# Patient Record
Sex: FEMALE | Race: OTHER | NOT HISPANIC OR LATINO
[De-identification: names, ages, dates, MRNs, and addresses within clinical notes are randomized per-mention and may not be internally consistent; named-entity substitution may affect disease eponyms.]

---

## 2023-06-12 ENCOUNTER — APPOINTMENT (OUTPATIENT)
Dept: PLASTIC SURGERY | Facility: CLINIC | Age: 38
End: 2023-06-12
Payer: COMMERCIAL

## 2023-06-12 PROBLEM — Z00.00 ENCOUNTER FOR PREVENTIVE HEALTH EXAMINATION: Status: ACTIVE | Noted: 2023-06-12

## 2023-06-12 PROCEDURE — 99203 OFFICE O/P NEW LOW 30 MIN: CPT | Mod: 95

## 2023-06-12 NOTE — HISTORY OF PRESENT ILLNESS
[Home] : at home, [unfilled] , at the time of the visit. [Medical Office: ([facility])___] : at the medical office located in  [Verbal consent obtained from patient] : the patient, [unfilled]

## 2023-06-13 ENCOUNTER — TRANSCRIPTION ENCOUNTER (OUTPATIENT)
Age: 38
End: 2023-06-13

## 2023-06-14 NOTE — DISCUSSION/SUMMARY
[FreeTextEntry1] : This regan patient began transitioning in 2005/2006.\par She has been on hormonal therapy consistently since 2006/2007.\par She has been in therapy and was diagnosed with Gender Dysphoria concerned about certain facial features that appear masculine and cause bullying desires facial feminization surgery followed by Transgender team.\par The following facial features appear masculine and will need to be modified: \par -Nose \par -Chin\par -Lip\par \par + FH: noncontributory \par \par Allergies:\par none\par \par Meds:\par -estrogen \par - Progesterone\par \par Surgical History\par Surgery Type: castration\par Year: 2011\par Complications?: none\par \par Denies previous botox, fillers or silicone injections. \par \par SH: Admits to marijuana use, no secondary tobacco use\par \par ROS:\par General health / Constitutional: no fever, no chills, no unusual weight changes, no energy level changes, no night sweats \par Skin: No color or pigmentation changes, no pruritis, no rashes, no ulcers, \par Hair: No changes in color, texture, distribution, loss \par Nails: No color changes, brittleness, infection \par Head: No headaches, no new jaw pain \par Eyes: Good visual acuity, no color blindness, no corrective lenses, no photophobia, no diplopia, no blurred vision, no infection, pain, no medications, \par Ear: no tinnitus, no discharge, no pain, no medications \par Nose: no epistaxis, no rhinorrhea, no rhinitis, no pain, \par Mouth & Throat: no gingivitis, no gingival bleeding, no ulcers, no voice changes, no changes in oral mucosa or tongue Neck no stiffness, no pain, no lymphadenitis, no thyroid disorders, \par Respiratory: no cough, no dyspnea, no wheezing, no chest pain, cyanosis, no pneumonia, no asthma, \par Cardiovascular: no chest pain, no palpitations, no irregular rhythm, no tachycardia, no bradycardia, no heart failure, no dyspnea on exertion (TADEO), no edema \par Gastrointestinal: no nausea / vomiting, no dysphagia, no reflux / GERD, no abdominal pain, no jaundice \par Musculoskeletal: no pain in muscles, bones, or joints; no fractures, no dislocations, no muscular weakness, no atrophy \par Neurological: no paresis, no paralysis, no paresthesia, no seizures, no dizziness, no syncope, no ataxia, no tremor \par Psychological: no childhood behavioral problems, no irritability, no sleep changes \par Hematological: no anemia, no bruising, no bleeding tendencies, \par \par PHYSICAL EXAM \par General: WDWN, in no distress, A & O x 3 (person, place, time) \par HEENT Head: AT/NC (atraumatic, normocephalic), including TMJ, sensory and motor; \par Eyes: EOMI, PERRLA \par Ears: exterior, nl hearing, \par Nose: + slightly wide, bulbous nasal tip with acute nasolabial angle intranasal exam showed enlarged turbinates and deviated caudal septum \par Throat & mouth: gd palate elevation, nl tongue mobility, nl tonsil size; + slightly prominent mandibular angle with active masseter, boxy wide chin, Hypoplastic upper lip.\par Neck: no masses, nl pulses, no prominent tracheal bulge ("Roberth's Apple") \par \par We had a 25 minute meeting with the patient discussing diagnosis and medical management issues and outcomes. \par \par First stage FFS: \par 21122: Osseous genioplasty narrowing, shortening\par 21125: Mandibular reconstruction with prosthetics bilateral\par 00880: Rhinoplasty open\par 60016: Submucous resection of septum\par 18101: Cartilage grafting for nasal reconstruction, use of cadaveric cartilage for  grafts, columellar strut, tip graft\par 68465: Upper lip augmentation with temporalis fascia\par \par ·       93306 (osseous genioplasty): This patient has a wide, large chin that contributes to her feelings of gender dysphoria and being mis-gendered in public.  The patient would benefit from an osseous genioplasty that narrows and shortens the chin. The osseous genioplasty will help create a more feminine and more, slender chin. \par \par ·       51410 (Reconstruction of lower jaw with prosthetic): This patient complained of a male shape to the lower one-third of her face. Reconstruction with a titanium implant used to create a chin point angle and support the bone in healing will help the patient achieve her goal of a more female lower face.\par \par ·       06486 (Rhinoplasty): This patient’s nose has characteristics of a male nose. The male nose is often larger and wider with a more bulbous nasal tip. These male nasal features make her feel masculine which exacerbates her gender dysphoria. A rhinoplasty would be beneficial to feminize her nose by creating a smaller nose and more delicate, softer nasal tip. The lateral dorsal shape will also be feminized by the rhinoplasty.\par \par ·       17114 (Submucous resection of nose): This patient’s nasal septum is shaped like a male septum. The septum is the supportive pole that holds up the structure of the nasal pyramid. In this case the septum will also be used to provide cartilage tissue necessary for nasal grafts. This septoplasty is required to modify the septum to create a straight nose with good functional breathing while taking away the characteristics of a male nose.\par \par ·       41747 (Cartilage grafting for nasal reconstruction): Cartilage grafting is crucial for the performance of the feminizing rhinoplasty. This patient has an ethnic type of nose. With regards to her nose, she wants to keep true to her ethnicity while appearing more feminine. To do that cartilage grafts including, bilateral  grafts, a columellar strut graft, a dorsal onlay graft, and nasal tip graft are all necessary.  The dorsal onlay graft will raise the nasal radix and improve the frontonasal regional shape.\par \par ·       05604 (Upper lip augmentation): Females are shown to have zavaleta lips than males, therefore, an upper lip augmentation will create a more feminine appearance for this patient as she has currently has hypoplastic lips. Lip augmentation is also a procedure that not all patients need but we deem that this procedure is necessary for this patient as it well help alleviate her gender dysphoria. \par \par No CT scan needed. She will need to provide a letter from her therapist and hormone provider. Will need PCP clearance.\par \par Patient seen in conjunction with Dr. Stan Cook.\par

## 2024-04-15 ENCOUNTER — APPOINTMENT (OUTPATIENT)
Dept: PLASTIC SURGERY | Facility: CLINIC | Age: 39
End: 2024-04-15

## 2024-04-15 PROCEDURE — XXXXX: CPT | Mod: 1L

## 2024-04-22 ENCOUNTER — TRANSCRIPTION ENCOUNTER (OUTPATIENT)
Age: 39
End: 2024-04-22

## 2024-04-22 ENCOUNTER — APPOINTMENT (OUTPATIENT)
Dept: PLASTIC SURGERY | Facility: CLINIC | Age: 39
End: 2024-04-22
Payer: COMMERCIAL

## 2024-04-22 VITALS
SYSTOLIC BLOOD PRESSURE: 143 MMHG | HEIGHT: 65 IN | WEIGHT: 170.42 LBS | TEMPERATURE: 98 F | RESPIRATION RATE: 16 BRPM | OXYGEN SATURATION: 100 % | DIASTOLIC BLOOD PRESSURE: 88 MMHG | HEART RATE: 70 BPM

## 2024-04-22 PROCEDURE — 99214 OFFICE O/P EST MOD 30 MIN: CPT

## 2024-04-22 NOTE — DISCUSSION/SUMMARY
[FreeTextEntry1] : Patient presented for preoperative consultation prior to facial feminization surgery We reviewed the virtual planning with the patient She desires to have the following facial features modified: -Nose -Jawline -Neck -Lip augmentation We reviewed these procedures and their risks   FH: noncontributory   SH: no secondary tobacco use,   ROS: General health / Constitutional      no fever, no chills, no unusual weight changes, no energy level changes, no night sweats Skin       No color or pigmentation changes, no pruritis, no rashes, no ulcers, Hair       No changes in color, texture,  distribution, loss Nails       No color changes, brittleness, infection Head       No headaches, no new jaw pain Eyes       Good visual acuity, no color blindness, no corrective lenses, no photophobia, no diplopia, no blurred vision, no infection, pain, no medications, Ear      no tinnitus, no discharge, no pain, no medications Nose      no epistaxis, no rhinorrhea, no rhinitis, no pain, Mouth & Throat      no gingivitis, no gingival bleeding, no ulcers, no voice changes, no changes in oral mucosa or tongue Neck      no stiffness, no pain, no lymphadenitis, no thyroid disorders, Respiratory      no cough, no dyspnea, no wheezing,  no chest pain, cyanosis, no pneumonia, no asthma, Cardiovascular      no chest pain, no palpitations, no irregular rhythm, no tachycardia, no bradycardia, no heart failure, no dyspnea on exertion (TADEO), no edema Gastrointestinal      no nausea / vomiting, no dysphagia, no reflux / GERD, no abdominal pain, no jaundice Musculoskeletal      no pain in muscles, bones, or joints; no fractures, no dislocations, no muscular weakness, no atrophy Neurological      no paresis, no paralysis, no paresthesia, no seizures, no dizziness, no syncope, no ataxia, no tremor Psychological      no childhood behavioral problems, no irritability, no sleep changes Hematological      no anemia, no bruising, no bleeding tendencies,   PHYSICAL EXAM General WDWN, in no distress,  A & O x 3 (person, place, time) HEENT Head: AT/NC (atraumatic, normocephalic), including TMJ, sensory and motor; Prominent brow and lateral orbital rim type III Eyes: EOMI, PERRLA Ears: exterior, nl hearing, Nose: slightly wide, bulbous nasal tip with acute nasiolabial angle intranasal exam showed enlarged turbinates and deviated caudal septum Throat & mouth: gd palate elevation, nl tongue mobility, nl tonsil size Prominent mandibular angle with active masseter Wide chin   Neck: no masses, nl pulses +excess submental fat   We had a 25 minute meeting with the patient discussing diagnosis and medical management issues and outcomes.  FFS: -mandibular angle reduction b/l -chin narrowing -rhinoplasty, SMR, cartilage grafting -submental fat excision, platysmaplasty -Lip augmentation   Spend 35 minutes with patient discussing the diagnosis and treatment plan. Patient informed of the timing and risks moving forward. All patient questions were answered. Patient was given written instructions for care and follow up visit. We discussed the instructions and the patient confirmed an understanding of them.   Bleeding- It is possible, though unusual, to experience a bleeding episode during or after surgery. Should post-operative bleeding occur, it may require emergency treatment to drain accumulated blood or blood transfusion. Intra-operative blood transfusion may also be required. Do not take any aspirin or anti-inflammatory medications for ten days before or after surgery, as this may increase the risk of bleeding. Non-prescription herbs and dietary supplements can increase the risk of surgical bleeding. Hematoma can occur at any time following injury to the breast. If blood transfusions are necessary to treat blood loss, there is the risk of blood-related infections such as hepatitis and HIV (AIDS). Heparin medications that are used to prevent blood clots in veins can produce bleeding and decreased blood platelets.   Infection- Infection is unusual after surgery. Should an infection occur, additional treatment including antibiotics, hospitalization, or additional surgery may be necessary.   Change Skin Sensation- You may experience a diminished (or loss) of sensitivity of the skin of operative area. Partial or permanent loss of skin sensation can occur after surgery. Skin Contour Irregularities- Contour and shape irregularities may occur after surgery. Visible and palpable wrinkling may occur. Residual skin irregularities at the ends of the incisions or dog ears are always a possibility when there is excessive redundant skin. This may improve with time, or it can be surgically corrected.   Sutures- Most surgical techniques use deep sutures. You may notice these sutures after your surgery. Sutures may spontaneously poke through the skin, become visible or produce irritation that requires suture removal.   Skin Discoloration / Swelling- Some bruising and swelling normally occurs following surgery. The skin in or near the surgical site can appear either lighter or darker than surrounding skin. Although uncommon, swelling and skin discoloration may persist for long periods of time and, in rare situations, may be permanent.   Skin Sensitivity- Itching, tenderness, or exaggerated responses to hot or cold temperatures may occur after surgery. Usually this resolves during healing, but in rare situations it may be chronic.   Scarring- All surgery leaves scars, some more visible than others. Although good wound healing after a surgical procedure is expected, abnormal scars may occur within the skin and deeper tissues. Scars may be unattractive and of different color than the surrounding skin tone. Scar appearance may also vary within the same scar. Scars may be asymmetrical (appear different on the right and left side of the body). There is the possibility of visible marks in the skin from sutures. In some cases scars may require surgical revision or treatment.   Damage to Deeper Structures- There is the potential for injury to deeper structures including, nerves, blood vessels, muscles during any surgical procedure. The potential for this to occur varies according to the type of procedure being performed. Injury to deeper structures may be temporary or permanent.   Delayed Healing- Wound disruption or delayed wound healing is possible. Some areas of the skin may not heal normally and may take a long time to heal. Areas of skin tissue may die. This may require frequent dressing changes or further surgery to remove the non-healed tissue. Individuals who have decreased blood supply to tissue from past surgery or radiation therapy may be at increased risk for wound healing and poor surgical outcome. Smokers have a greater risk of skin loss and wound healing complications.   Fat Necrosis- Fatty tissue found deep in the skin might die. This may produce areas of firmness within the skin. Additional surgery to remove areas of fat necrosis may be necessary. There is the possibility of contour irregularities in the skin that may result from fat necrosis.   Allergic Reactions- In rare cases, local allergies to tape, suture material and glues, blood products, topical preparations or injected agents have been reported. Serious systemic reactions including shock (anaphylaxis) may occur in response to drugs used during surgery and prescription medicines. Allergic reactions may require additional treatment.

## 2024-04-23 ENCOUNTER — APPOINTMENT (OUTPATIENT)
Dept: PLASTIC SURGERY | Facility: HOSPITAL | Age: 39
End: 2024-04-23
Payer: COMMERCIAL

## 2024-04-23 ENCOUNTER — INPATIENT (INPATIENT)
Facility: HOSPITAL | Age: 39
LOS: 0 days | Discharge: HOME | End: 2024-04-24
Attending: SURGERY | Admitting: SURGERY
Payer: COMMERCIAL

## 2024-04-23 DIAGNOSIS — K08.409 PARTIAL LOSS OF TEETH, UNSPECIFIED CAUSE, UNSPECIFIED CLASS: Chronic | ICD-10-CM

## 2024-04-23 DIAGNOSIS — Z90.79 ACQUIRED ABSENCE OF OTHER GENITAL ORGAN(S): Chronic | ICD-10-CM

## 2024-04-23 PROCEDURE — 40799 UNLISTED PROCEDURE LIPS: CPT

## 2024-04-23 PROCEDURE — 21125 AUGMENTATION MNDBLR PROSTC: CPT

## 2024-04-23 PROCEDURE — 30520 REPAIR OF NASAL SEPTUM: CPT

## 2024-04-23 PROCEDURE — 30410 RECONSTRUCTION OF NOSE: CPT

## 2024-04-23 PROCEDURE — 20912 REMOVE CARTILAGE FOR GRAFT: CPT

## 2024-04-23 PROCEDURE — 21122 GENIOP SLDG OSTEOT 2/>: CPT

## 2024-04-23 DEVICE — SCREW MAXDRIVE 1 LVL 2X7MM: Type: IMPLANTABLE DEVICE | Site: BILATERAL | Status: FUNCTIONAL

## 2024-04-23 DEVICE — SCREW EMERG CROSS DRIVE TI 2X9: Type: IMPLANTABLE DEVICE | Site: BILATERAL | Status: FUNCTIONAL

## 2024-04-23 DEVICE — PLATE SMITH GENIOPLASTY 2X5MM: Type: IMPLANTABLE DEVICE | Site: BILATERAL | Status: FUNCTIONAL

## 2024-04-23 RX ORDER — ACETAMINOPHEN 500 MG
975 TABLET ORAL EVERY 6 HOURS
Refills: 0 | Status: DISCONTINUED | OUTPATIENT
Start: 2024-04-23 | End: 2024-04-24

## 2024-04-23 RX ORDER — OXYCODONE HYDROCHLORIDE 5 MG/1
5 TABLET ORAL EVERY 4 HOURS
Refills: 0 | Status: DISCONTINUED | OUTPATIENT
Start: 2024-04-23 | End: 2024-04-24

## 2024-04-23 RX ORDER — SENNA PLUS 8.6 MG/1
2 TABLET ORAL AT BEDTIME
Refills: 0 | Status: DISCONTINUED | OUTPATIENT
Start: 2024-04-23 | End: 2024-04-24

## 2024-04-23 RX ORDER — BENZOCAINE AND MENTHOL 5; 1 G/100ML; G/100ML
1 LIQUID ORAL
Refills: 0 | Status: DISCONTINUED | OUTPATIENT
Start: 2024-04-23 | End: 2024-04-24

## 2024-04-23 RX ORDER — HYDROMORPHONE HYDROCHLORIDE 2 MG/ML
0.5 INJECTION INTRAMUSCULAR; INTRAVENOUS; SUBCUTANEOUS EVERY 4 HOURS
Refills: 0 | Status: DISCONTINUED | OUTPATIENT
Start: 2024-04-23 | End: 2024-04-23

## 2024-04-23 RX ORDER — OXYCODONE HYDROCHLORIDE 5 MG/1
10 TABLET ORAL EVERY 4 HOURS
Refills: 0 | Status: DISCONTINUED | OUTPATIENT
Start: 2024-04-23 | End: 2024-04-24

## 2024-04-23 RX ORDER — ACETAMINOPHEN 500 MG
1000 TABLET ORAL ONCE
Refills: 0 | Status: COMPLETED | OUTPATIENT
Start: 2024-04-23 | End: 2024-04-23

## 2024-04-23 RX ORDER — DIAZEPAM 5 MG
5 TABLET ORAL EVERY 8 HOURS
Refills: 0 | Status: DISCONTINUED | OUTPATIENT
Start: 2024-04-23 | End: 2024-04-24

## 2024-04-23 RX ORDER — LANOLIN ALCOHOL/MO/W.PET/CERES
3 CREAM (GRAM) TOPICAL AT BEDTIME
Refills: 0 | Status: DISCONTINUED | OUTPATIENT
Start: 2024-04-23 | End: 2024-04-24

## 2024-04-23 RX ORDER — GABAPENTIN 400 MG/1
300 CAPSULE ORAL
Refills: 0 | Status: DISCONTINUED | OUTPATIENT
Start: 2024-04-23 | End: 2024-04-24

## 2024-04-23 RX ORDER — ONDANSETRON 8 MG/1
4 TABLET, FILM COATED ORAL EVERY 6 HOURS
Refills: 0 | Status: DISCONTINUED | OUTPATIENT
Start: 2024-04-23 | End: 2024-04-24

## 2024-04-23 RX ORDER — CEFAZOLIN SODIUM 1 G
2000 VIAL (EA) INJECTION EVERY 8 HOURS
Refills: 0 | Status: DISCONTINUED | OUTPATIENT
Start: 2024-04-23 | End: 2024-04-24

## 2024-04-23 RX ORDER — HYDROMORPHONE HYDROCHLORIDE 2 MG/ML
0.25 INJECTION INTRAMUSCULAR; INTRAVENOUS; SUBCUTANEOUS ONCE
Refills: 0 | Status: DISCONTINUED | OUTPATIENT
Start: 2024-04-23 | End: 2024-04-23

## 2024-04-23 RX ORDER — DIPHENHYDRAMINE HCL 50 MG
25 CAPSULE ORAL EVERY 4 HOURS
Refills: 0 | Status: DISCONTINUED | OUTPATIENT
Start: 2024-04-23 | End: 2024-04-24

## 2024-04-23 RX ORDER — SODIUM CHLORIDE 9 MG/ML
1000 INJECTION, SOLUTION INTRAVENOUS
Refills: 0 | Status: DISCONTINUED | OUTPATIENT
Start: 2024-04-23 | End: 2024-04-24

## 2024-04-23 RX ORDER — CHLORHEXIDINE GLUCONATE 213 G/1000ML
15 SOLUTION TOPICAL
Refills: 0 | Status: DISCONTINUED | OUTPATIENT
Start: 2024-04-23 | End: 2024-04-24

## 2024-04-23 RX ORDER — METOCLOPRAMIDE HCL 10 MG
10 TABLET ORAL EVERY 8 HOURS
Refills: 0 | Status: DISCONTINUED | OUTPATIENT
Start: 2024-04-23 | End: 2024-04-24

## 2024-04-23 RX ADMIN — HYDROMORPHONE HYDROCHLORIDE 0.25 MILLIGRAM(S): 2 INJECTION INTRAMUSCULAR; INTRAVENOUS; SUBCUTANEOUS at 14:10

## 2024-04-23 RX ADMIN — HYDROMORPHONE HYDROCHLORIDE 0.5 MILLIGRAM(S): 2 INJECTION INTRAMUSCULAR; INTRAVENOUS; SUBCUTANEOUS at 12:50

## 2024-04-23 RX ADMIN — HYDROMORPHONE HYDROCHLORIDE 0.5 MILLIGRAM(S): 2 INJECTION INTRAMUSCULAR; INTRAVENOUS; SUBCUTANEOUS at 12:35

## 2024-04-23 RX ADMIN — OXYCODONE HYDROCHLORIDE 10 MILLIGRAM(S): 5 TABLET ORAL at 16:10

## 2024-04-23 RX ADMIN — OXYCODONE HYDROCHLORIDE 10 MILLIGRAM(S): 5 TABLET ORAL at 16:45

## 2024-04-23 RX ADMIN — Medication 100 MILLIGRAM(S): at 16:01

## 2024-04-23 RX ADMIN — SODIUM CHLORIDE 75 MILLILITER(S): 9 INJECTION, SOLUTION INTRAVENOUS at 12:30

## 2024-04-23 RX ADMIN — CHLORHEXIDINE GLUCONATE 15 MILLILITER(S): 213 SOLUTION TOPICAL at 18:13

## 2024-04-23 RX ADMIN — OXYCODONE HYDROCHLORIDE 10 MILLIGRAM(S): 5 TABLET ORAL at 22:41

## 2024-04-23 RX ADMIN — SODIUM CHLORIDE 75 MILLILITER(S): 9 INJECTION, SOLUTION INTRAVENOUS at 14:27

## 2024-04-23 RX ADMIN — GABAPENTIN 300 MILLIGRAM(S): 400 CAPSULE ORAL at 18:11

## 2024-04-23 RX ADMIN — OXYCODONE HYDROCHLORIDE 10 MILLIGRAM(S): 5 TABLET ORAL at 21:41

## 2024-04-23 RX ADMIN — Medication 975 MILLIGRAM(S): at 18:11

## 2024-04-23 RX ADMIN — Medication 1000 MILLIGRAM(S): at 13:45

## 2024-04-23 RX ADMIN — SENNA PLUS 2 TABLET(S): 8.6 TABLET ORAL at 21:42

## 2024-04-23 RX ADMIN — Medication 400 MILLIGRAM(S): at 13:30

## 2024-04-23 RX ADMIN — HYDROMORPHONE HYDROCHLORIDE 0.25 MILLIGRAM(S): 2 INJECTION INTRAMUSCULAR; INTRAVENOUS; SUBCUTANEOUS at 13:55

## 2024-04-23 NOTE — PRE-ANESTHESIA EVALUATION ADULT - NSANTHPEFT_GEN_ALL_CORE
well developed well nourished transgender female in NAD  awake, alert, and oriented  +S1/S2 B/L air entry

## 2024-04-23 NOTE — BRIEF OPERATIVE NOTE - NSICDXBRIEFPROCEDURE_GEN_ALL_CORE_FT
PROCEDURES:  Contouring, mandible 23-Apr-2024 11:35:49  Jarrod Vargas  Rhinoplasty, using cartilage graft 23-Apr-2024 11:36:04  Jarrod Vargas

## 2024-04-24 ENCOUNTER — TRANSCRIPTION ENCOUNTER (OUTPATIENT)
Age: 39
End: 2024-04-24

## 2024-04-24 VITALS
TEMPERATURE: 98 F | HEART RATE: 83 BPM | OXYGEN SATURATION: 97 % | SYSTOLIC BLOOD PRESSURE: 143 MMHG | DIASTOLIC BLOOD PRESSURE: 87 MMHG | RESPIRATION RATE: 16 BRPM

## 2024-04-24 PROCEDURE — C9143: CPT

## 2024-04-24 PROCEDURE — C1889: CPT

## 2024-04-24 PROCEDURE — C1713: CPT

## 2024-04-24 RX ORDER — IBUPROFEN 200 MG
1 TABLET ORAL
Qty: 28 | Refills: 0
Start: 2024-04-24

## 2024-04-24 RX ORDER — CHLORHEXIDINE GLUCONATE 213 G/1000ML
15 SOLUTION TOPICAL
Qty: 1 | Refills: 0
Start: 2024-04-24 | End: 2024-05-03

## 2024-04-24 RX ORDER — OXYCODONE HYDROCHLORIDE 5 MG/1
1 TABLET ORAL
Qty: 12 | Refills: 0
Start: 2024-04-24

## 2024-04-24 RX ORDER — GABAPENTIN 400 MG/1
1 CAPSULE ORAL
Qty: 10 | Refills: 0
Start: 2024-04-24

## 2024-04-24 RX ADMIN — Medication 975 MILLIGRAM(S): at 06:53

## 2024-04-24 RX ADMIN — GABAPENTIN 300 MILLIGRAM(S): 400 CAPSULE ORAL at 06:54

## 2024-04-24 RX ADMIN — CHLORHEXIDINE GLUCONATE 15 MILLILITER(S): 213 SOLUTION TOPICAL at 06:53

## 2024-04-24 RX ADMIN — OXYCODONE HYDROCHLORIDE 10 MILLIGRAM(S): 5 TABLET ORAL at 11:14

## 2024-04-24 RX ADMIN — Medication 975 MILLIGRAM(S): at 00:11

## 2024-04-24 RX ADMIN — Medication 3 MILLIGRAM(S): at 00:11

## 2024-04-24 RX ADMIN — Medication 100 MILLIGRAM(S): at 00:11

## 2024-04-24 RX ADMIN — Medication 975 MILLIGRAM(S): at 13:38

## 2024-04-24 RX ADMIN — Medication 100 MILLIGRAM(S): at 07:48

## 2024-04-24 NOTE — PROGRESS NOTE ADULT - SUBJECTIVE AND OBJECTIVE BOX
STATUS POST: FFS    POST OPERATIVE DAY #: 1    SUBJECTIVE: Patient seen and examined at bedside. Pain well-tolerated, tolerating CLD without n/v. Denies cp, sob.      ceFAZolin   IVPB 2000 milliGRAM(s) IV Intermittent every 8 hours      Vital Signs Last 24 Hrs  T(C): 36.4 (24 Apr 2024 05:04), Max: 36.9 (23 Apr 2024 20:40)  T(F): 97.5 (24 Apr 2024 05:04), Max: 98.5 (23 Apr 2024 20:40)  HR: 69 (24 Apr 2024 05:04) (62 - 86)  BP: 154/74 (24 Apr 2024 05:04) (119/66 - 166/84)  BP(mean): 120 (23 Apr 2024 13:45) (82 - 125)  RR: 18 (24 Apr 2024 05:04) (14 - 20)  SpO2: 97% (24 Apr 2024 05:04) (95% - 100%)    Parameters below as of 24 Apr 2024 05:04  Patient On (Oxygen Delivery Method): room air      I&O's Detail    23 Apr 2024 07:01  -  24 Apr 2024 07:00  --------------------------------------------------------  IN:    IV PiggyBack: 50 mL    Lactated Ringers: 1550 mL  Total IN: 1600 mL    OUT:    Voided (mL): 1900 mL  Total OUT: 1900 mL    Total NET: -300 mL          General: NAD, resting comfortably in bed  HEENT: OR pressure dressing and drip dressing removed; incisions c/d/i.  C/V: NSR  Pulm: Nonlabored breathing, no respiratory distress  Abd: soft, NT/ND.  Extrem: WWP, no edema, SCDs in place

## 2024-04-24 NOTE — DISCHARGE NOTE NURSING/CASE MANAGEMENT/SOCIAL WORK - NSDCPEFALRISK_GEN_ALL_CORE
For information on Fall & Injury Prevention, visit: [email or web address] OR  [email or web address] OR  https://www.cdc.gov/steadi/patient.html

## 2024-04-24 NOTE — PATIENT PROFILE ADULT - FALL HARM RISK - HARM RISK INTERVENTIONS

## 2024-04-24 NOTE — ASU DISCHARGE PLAN (ADULT/PEDIATRIC) - NS MD DC FALL RISK RISK
For information on Fall & Injury Prevention, visit: [email or web address] OR  [email or web address] OR  https://www.cdc.gov/steadi/patient.html

## 2024-04-24 NOTE — DISCHARGE NOTE NURSING/CASE MANAGEMENT/SOCIAL WORK - PATIENT PORTAL LINK FT
You can access the FollowMyHealth Patient Portal offered by [facility] by registering at the following website: [email or web address]. By joining [facility]’s FollowMyHealth portal, you will also be able to view your health information using other applications (apps) compatible with our system.

## 2024-04-24 NOTE — ASU DISCHARGE PLAN (ADULT/PEDIATRIC) - ASU DC SPECIAL INSTRUCTIONSFT
1. Please follow instructions provided by your surgeon's office.  2. Please take medications as prescribed.

## 2024-04-30 RX ORDER — PROGESTERONE 200 MG/1
200 CAPSULE, LIQUID FILLED ORAL
Refills: 0 | DISCHARGE

## 2024-05-02 ENCOUNTER — APPOINTMENT (OUTPATIENT)
Dept: PLASTIC SURGERY | Facility: CLINIC | Age: 39
End: 2024-05-02

## 2024-05-02 VITALS
TEMPERATURE: 97.5 F | HEIGHT: 65 IN | HEART RATE: 82 BPM | DIASTOLIC BLOOD PRESSURE: 82 MMHG | BODY MASS INDEX: 27.32 KG/M2 | WEIGHT: 164 LBS | SYSTOLIC BLOOD PRESSURE: 120 MMHG | OXYGEN SATURATION: 100 %

## 2024-05-02 DIAGNOSIS — F64.9 GENDER IDENTITY DISORDER, UNSPECIFIED: ICD-10-CM

## 2024-05-03 PROBLEM — F64.9 GENDER IDENTITY DISORDER, UNSPECIFIED: Chronic | Status: ACTIVE | Noted: 2024-04-22

## 2024-05-06 ENCOUNTER — APPOINTMENT (OUTPATIENT)
Dept: PLASTIC SURGERY | Facility: CLINIC | Age: 39
End: 2024-05-06
Payer: COMMERCIAL

## 2024-05-06 DIAGNOSIS — Z09 ENCOUNTER FOR FOLLOW-UP EXAMINATION AFTER COMPLETED TREATMENT FOR CONDITIONS OTHER THAN MALIGNANT NEOPLASM: ICD-10-CM

## 2024-05-06 DIAGNOSIS — F64.9 GENDER IDENTITY DISORDER, UNSPECIFIED: ICD-10-CM

## 2024-05-06 PROCEDURE — 99024 POSTOP FOLLOW-UP VISIT: CPT

## 2024-05-08 NOTE — PHYSICAL EXAM
[de-identified] : Alert, calm, cooperative. [de-identified] : Nasal, oral, upper lip incisions with no signs of wound dehiscence or fluctuance. Moderate edema and mild ecchymosis noted but improving. [de-identified] : Respirations even and unlabored.

## 2024-05-08 NOTE — HISTORY OF PRESENT ILLNESS
[FreeTextEntry1] : BRIAN is a 39 year old transgender female patient that presents on a telehealth today for a post operative evaluation s/p  Facial feminization surgery with osseous genioplasty narrowing shortening advancement, Open rhinoplasty procedure, Submucous resection of the septum, MTF cartilage for bilateral  grafts, columellar strut tip graft, dorsal onlay graft, Upper lip augmentation with fascial graft, Mandible conjoined with autologous tissue on 4/23/2024. Patient denies having any significant concerns or complaints.

## 2024-05-13 ENCOUNTER — APPOINTMENT (RX ONLY)
Dept: URBAN - METROPOLITAN AREA CLINIC 184 | Facility: CLINIC | Age: 39
Setting detail: DERMATOLOGY
End: 2024-05-13

## 2024-05-13 DIAGNOSIS — L21.8 OTHER SEBORRHEIC DERMATITIS: ICD-10-CM | Status: INADEQUATELY CONTROLLED

## 2024-05-13 PROCEDURE — ? COUNSELING

## 2024-05-13 PROCEDURE — ? PRESCRIPTION

## 2024-05-13 PROCEDURE — 99204 OFFICE O/P NEW MOD 45 MIN: CPT

## 2024-05-13 RX ORDER — HYDROCORTISONE 25 MG/G
CREAM TOPICAL BID
Qty: 30 | Refills: 1 | Status: ERX | COMMUNITY
Start: 2024-05-13

## 2024-05-13 RX ORDER — KETOCONAZOLE 20 MG/ML
SHAMPOO, SUSPENSION TOPICAL
Qty: 120 | Refills: 6 | Status: ERX | COMMUNITY
Start: 2024-05-13

## 2024-05-13 RX ADMIN — HYDROCORTISONE: 25 CREAM TOPICAL at 00:00

## 2024-05-13 RX ADMIN — KETOCONAZOLE: 20 SHAMPOO, SUSPENSION TOPICAL at 00:00

## 2024-10-01 NOTE — PROGRESS NOTE ADULT - ASSESSMENT
39M POD1 FFS, recovering well.    CLD  Pain and nausea control PRN  Ancef  SQL/SCDs/OOBA/IS  Dc home today 18

## (undated) DEVICE — SUT PLAIN GUT 4-0 18" PS-2

## (undated) DEVICE — DRSG KERLIX ROLL 4.5"

## (undated) DEVICE — SUT MONOCRYL 4-0 18" P-3

## (undated) DEVICE — BIPOLAR FORCEP SYMMETRY BAYONET STR 8.25" X 1.5MM (BLUE)

## (undated) DEVICE — MARKING PEN W RULER

## (undated) DEVICE — SYR LUER LOK 50CC

## (undated) DEVICE — APPLICATOR Q TIP 6" WOOD STEM

## (undated) DEVICE — SUT CHROMIC 5-0 18" P-3

## (undated) DEVICE — SYR LUER LOK 5CC

## (undated) DEVICE — DRAIN BLAKE 10FR ROUND

## (undated) DEVICE — BLADE SURGICAL #10 CARBON

## (undated) DEVICE — SUT ETHILON 5-0 18" PS-2

## (undated) DEVICE — Device

## (undated) DEVICE — MIDAS REX LEGEND TAPERED SM BORE 2.3MM X 8CM

## (undated) DEVICE — ELCTR HOLSTER ACCESSORY

## (undated) DEVICE — TWIST DRILL 1.5MM DIA X 50MM W/NOTCH SGL USE ONLY

## (undated) DEVICE — SUT PROLENE 3-0 18" PS-1

## (undated) DEVICE — AESCULAP SCALPFIX 10 CLIPS

## (undated) DEVICE — DRSG MASTISOL

## (undated) DEVICE — SUCTION YANKAUER BULBOUS TIP NO VENT

## (undated) DEVICE — DRSG GAUZE MOISTURIZER 0.5 OZ 4X8

## (undated) DEVICE — BUR STRYKER EGG 4MM

## (undated) DEVICE — DRSG XEROFORM 1 X 8"

## (undated) DEVICE — RASP STRYKER LARGE TEAR CROSSCUT 14X7MM DISP

## (undated) DEVICE — CATH ANGIOCATH 12G

## (undated) DEVICE — NDL SPINAL 22G X 3.5" (BLACK)

## (undated) DEVICE — ELCTR BOVIE TIP BLADE VALLEYLAB 6.5"

## (undated) DEVICE — DRSG SPANDAGE TUBULAR ELASTIC RETAINER NET SIZE 10

## (undated) DEVICE — SUT ETHILON 6-0 18" P-3

## (undated) DEVICE — TAPE SILK 3"

## (undated) DEVICE — DRAIN RESERVOIR FOR JACKSON PRATT 100CC CARDINAL

## (undated) DEVICE — SUT CHROMIC 3-0 27" PS-2

## (undated) DEVICE — SUT SILK 2-0 30" PSL

## (undated) DEVICE — SUT PLAIN GUT FAST ABSORBING 5-0 PC-1

## (undated) DEVICE — SYR LUER LOK 1CC

## (undated) DEVICE — SUCTION YANKAUER VITAL VUE

## (undated) DEVICE — DRAPE MAGNETIC INSTRUMENT MEDIUM

## (undated) DEVICE — DRAPE TOWEL BLUE 17" X 24"

## (undated) DEVICE — BLADE SURGICAL #15 CARBON

## (undated) DEVICE — BLADE SURGICAL #11 CARBON

## (undated) DEVICE — DRSG TELFA 3 X 8

## (undated) DEVICE — ELCTR COLORADO 3CM

## (undated) DEVICE — NDL 18G BLUNT FILL PINK

## (undated) DEVICE — DRSG CURITY GAUZE SPONGE 4 X 4" 12-PLY

## (undated) DEVICE — SAW BLADE OSTEOMED VRO 12MM

## (undated) DEVICE — NDL HYPO SAFE 25G X 1.5" (ORANGE)

## (undated) DEVICE — ELCTR BOVIE TIP BLADE INSULATED 4" EDGE

## (undated) DEVICE — SUT VICRYL 2-0 18" CP-2 UNDYED (POP-OFF)

## (undated) DEVICE — ELCTR GROUNDING PAD ADULT COVIDIEN

## (undated) DEVICE — SYR LUER LOK 10CC

## (undated) DEVICE — LAP PAD 4 X 18"

## (undated) DEVICE — SUT CHROMIC 3-0 18"  PS-4C

## (undated) DEVICE — DRSG AQUAPLAST BLANK BLUSH 3 X 3"

## (undated) DEVICE — SUT MONOCRYL 4-0 27" PS-2 UNDYED

## (undated) DEVICE — DRSG STERISTRIPS 0.5 X 4"

## (undated) DEVICE — COMPRESSION CHIN-NECK SMALL

## (undated) DEVICE — SUT PLAIN GUT 3-0 27" PS-2

## (undated) DEVICE — VAGINAL PACKING 2"

## (undated) DEVICE — SAW BLADE STRYKER RECIPROCATING 27MMX0.38MM

## (undated) DEVICE — PACK UPPER BODY

## (undated) DEVICE — FOLEY TRAY 16FR 5CC LF UMETER CLOSED

## (undated) DEVICE — BLOCK SILICON